# Patient Record
Sex: FEMALE | HISPANIC OR LATINO | URBAN - METROPOLITAN AREA
[De-identification: names, ages, dates, MRNs, and addresses within clinical notes are randomized per-mention and may not be internally consistent; named-entity substitution may affect disease eponyms.]

---

## 2017-07-09 ENCOUNTER — HOSPITAL ENCOUNTER (OUTPATIENT)
Facility: HOSPITAL | Age: 47
Discharge: LEFT BEFORE DISCHARGE | End: 2017-07-10
Attending: EMERGENCY MEDICINE
Payer: COMMERCIAL

## 2017-07-09 DIAGNOSIS — T81.9XXA POST-OPERATIVE COMPLICATION: ICD-10-CM

## 2017-07-09 LAB
ALBUMIN SERPL BCP-MCNC: 3.5 G/DL
ALP SERPL-CCNC: 72 U/L
ALT SERPL W/O P-5'-P-CCNC: 26 U/L
ANION GAP SERPL CALC-SCNC: 12 MMOL/L
AST SERPL-CCNC: 17 U/L
BASOPHILS # BLD AUTO: 0.01 K/UL
BASOPHILS NFR BLD: 0.2 %
BILIRUB SERPL-MCNC: 0.6 MG/DL
BUN SERPL-MCNC: 6 MG/DL
CALCIUM SERPL-MCNC: 9.6 MG/DL
CHLORIDE SERPL-SCNC: 105 MMOL/L
CO2 SERPL-SCNC: 24 MMOL/L
CREAT SERPL-MCNC: 0.7 MG/DL
DIFFERENTIAL METHOD: ABNORMAL
EOSINOPHIL # BLD AUTO: 0 K/UL
EOSINOPHIL NFR BLD: 0.3 %
ERYTHROCYTE [DISTWIDTH] IN BLOOD BY AUTOMATED COUNT: 23.6 %
EST. GFR  (AFRICAN AMERICAN): >60 ML/MIN/1.73 M^2
EST. GFR  (NON AFRICAN AMERICAN): >60 ML/MIN/1.73 M^2
GLUCOSE SERPL-MCNC: 98 MG/DL
HCT VFR BLD AUTO: 43 %
HGB BLD-MCNC: 14.4 G/DL
INR PPP: 1
LYMPHOCYTES # BLD AUTO: 1 K/UL
LYMPHOCYTES NFR BLD: 16.9 %
MAGNESIUM SERPL-MCNC: 1.9 MG/DL
MCH RBC QN AUTO: 26.1 PG
MCHC RBC AUTO-ENTMCNC: 33.5 %
MCV RBC AUTO: 78 FL
MONOCYTES # BLD AUTO: 0.5 K/UL
MONOCYTES NFR BLD: 8.9 %
NEUTROPHILS # BLD AUTO: 4.5 K/UL
NEUTROPHILS NFR BLD: 73.2 %
PLATELET # BLD AUTO: 183 K/UL
PMV BLD AUTO: 9.9 FL
POTASSIUM SERPL-SCNC: 3.3 MMOL/L
PROT SERPL-MCNC: 7 G/DL
PROTHROMBIN TIME: 11.1 SEC
RBC # BLD AUTO: 5.51 M/UL
SODIUM SERPL-SCNC: 141 MMOL/L
WBC # BLD AUTO: 6.09 K/UL

## 2017-07-09 PROCEDURE — 96361 HYDRATE IV INFUSION ADD-ON: CPT

## 2017-07-09 PROCEDURE — 83735 ASSAY OF MAGNESIUM: CPT

## 2017-07-09 PROCEDURE — 85610 PROTHROMBIN TIME: CPT

## 2017-07-09 PROCEDURE — 96374 THER/PROPH/DIAG INJ IV PUSH: CPT

## 2017-07-09 PROCEDURE — 63600175 PHARM REV CODE 636 W HCPCS: Performed by: EMERGENCY MEDICINE

## 2017-07-09 PROCEDURE — 96375 TX/PRO/DX INJ NEW DRUG ADDON: CPT

## 2017-07-09 PROCEDURE — 25000003 PHARM REV CODE 250: Performed by: EMERGENCY MEDICINE

## 2017-07-09 PROCEDURE — 85025 COMPLETE CBC W/AUTO DIFF WBC: CPT

## 2017-07-09 PROCEDURE — 99283 EMERGENCY DEPT VISIT LOW MDM: CPT | Mod: ,,, | Performed by: EMERGENCY MEDICINE

## 2017-07-09 PROCEDURE — 99285 EMERGENCY DEPT VISIT HI MDM: CPT | Mod: 25

## 2017-07-09 PROCEDURE — 80053 COMPREHEN METABOLIC PANEL: CPT

## 2017-07-09 RX ORDER — ONDANSETRON 2 MG/ML
4 INJECTION INTRAMUSCULAR; INTRAVENOUS
Status: COMPLETED | OUTPATIENT
Start: 2017-07-09 | End: 2017-07-09

## 2017-07-09 RX ADMIN — ONDANSETRON 4 MG: 2 INJECTION INTRAMUSCULAR; INTRAVENOUS at 09:07

## 2017-07-09 RX ADMIN — SODIUM CHLORIDE 1000 ML: 0.9 INJECTION, SOLUTION INTRAVENOUS at 09:07

## 2017-07-10 VITALS
DIASTOLIC BLOOD PRESSURE: 89 MMHG | HEIGHT: 65 IN | HEART RATE: 97 BPM | TEMPERATURE: 98 F | OXYGEN SATURATION: 99 % | WEIGHT: 170 LBS | SYSTOLIC BLOOD PRESSURE: 138 MMHG | RESPIRATION RATE: 18 BRPM | BODY MASS INDEX: 28.32 KG/M2

## 2017-07-10 PROBLEM — T81.9XXA POST-OPERATIVE COMPLICATION: Status: ACTIVE | Noted: 2017-07-10

## 2017-07-10 PROCEDURE — 63600175 PHARM REV CODE 636 W HCPCS: Performed by: EMERGENCY MEDICINE

## 2017-07-10 PROCEDURE — G0378 HOSPITAL OBSERVATION PER HR: HCPCS

## 2017-07-10 RX ORDER — IBUPROFEN 200 MG
16 TABLET ORAL
Status: DISCONTINUED | OUTPATIENT
Start: 2017-07-10 | End: 2017-07-10 | Stop reason: HOSPADM

## 2017-07-10 RX ORDER — IBUPROFEN 200 MG
24 TABLET ORAL
Status: DISCONTINUED | OUTPATIENT
Start: 2017-07-10 | End: 2017-07-10 | Stop reason: HOSPADM

## 2017-07-10 RX ORDER — MORPHINE SULFATE 2 MG/ML
2 INJECTION, SOLUTION INTRAMUSCULAR; INTRAVENOUS EVERY 4 HOURS PRN
Status: DISCONTINUED | OUTPATIENT
Start: 2017-07-10 | End: 2017-07-10 | Stop reason: HOSPADM

## 2017-07-10 RX ORDER — DEXAMETHASONE SODIUM PHOSPHATE 4 MG/ML
2 INJECTION, SOLUTION INTRA-ARTICULAR; INTRALESIONAL; INTRAMUSCULAR; INTRAVENOUS; SOFT TISSUE EVERY 12 HOURS
Status: DISCONTINUED | OUTPATIENT
Start: 2017-07-10 | End: 2017-07-10 | Stop reason: HOSPADM

## 2017-07-10 RX ORDER — ALPRAZOLAM 0.5 MG/1
1 TABLET ORAL
Status: DISCONTINUED | OUTPATIENT
Start: 2017-07-10 | End: 2017-07-10 | Stop reason: HOSPADM

## 2017-07-10 RX ORDER — INSULIN ASPART 100 [IU]/ML
0-5 INJECTION, SOLUTION INTRAVENOUS; SUBCUTANEOUS
Status: DISCONTINUED | OUTPATIENT
Start: 2017-07-10 | End: 2017-07-10 | Stop reason: HOSPADM

## 2017-07-10 RX ORDER — PANTOPRAZOLE SODIUM 40 MG/1
40 TABLET, DELAYED RELEASE ORAL DAILY
Status: DISCONTINUED | OUTPATIENT
Start: 2017-07-10 | End: 2017-07-10 | Stop reason: HOSPADM

## 2017-07-10 RX ORDER — PROCHLORPERAZINE EDISYLATE 5 MG/ML
5 INJECTION INTRAMUSCULAR; INTRAVENOUS
Status: COMPLETED | OUTPATIENT
Start: 2017-07-10 | End: 2017-07-10

## 2017-07-10 RX ORDER — GLUCAGON 1 MG
1 KIT INJECTION
Status: DISCONTINUED | OUTPATIENT
Start: 2017-07-10 | End: 2017-07-10 | Stop reason: HOSPADM

## 2017-07-10 RX ORDER — SODIUM CHLORIDE AND POTASSIUM CHLORIDE 150; 900 MG/100ML; MG/100ML
INJECTION, SOLUTION INTRAVENOUS CONTINUOUS
Status: DISCONTINUED | OUTPATIENT
Start: 2017-07-10 | End: 2017-07-10 | Stop reason: HOSPADM

## 2017-07-10 RX ADMIN — PROCHLORPERAZINE EDISYLATE 5 MG: 5 INJECTION INTRAMUSCULAR; INTRAVENOUS at 01:07

## 2017-07-10 NOTE — ED TRIAGE NOTES
Pt arrived to the ED with CC of an abscess to the base of the head. Pt states she has nausea and photosensitivity.

## 2017-07-10 NOTE — PROVIDER PROGRESS NOTES - EMERGENCY DEPT.
Encounter Date: 7/9/2017    ED Physician Progress Notes           Pt refused MRI  Wants to leave  Neurosurgery (admitting service) notified  AMA info placed on discharge instructions    Against Medical Advice    The patient chooses to sign out against medical advice and has the capacity to make that decision and the right to do so.  The patient was counseled on the risks of leaving including but not limited to death, disability, and understands these risks and wishes to leave anyway.  An attempt was made to remove barriers to accepting the recommended care.  The patient understands that they can return to the ED if they change their mind and will be treated to the best of my ability within the constraints of what they will allow me to do.      Antoine Villalobos MD, NED, CPE, FACEP  Department of Emergency Medicine  , [employer]

## 2017-07-10 NOTE — ED PROVIDER NOTES
Encounter Date: 7/9/2017    SCRIBE #1 NOTE: I, Erika Daniel, am scribing for, and in the presence of,  Dr. Redding. I have scribed the following portions of the note - the Resident attestation. Other sections scribed: Attending Notes .       History     Chief Complaint   Patient presents with    Abscess     Pt reports having surgery 3 weeks ago and now has abscess to neck below incision.     HPI   Patient is 48 yo female with history of metastatic breast cancer, diagnosed 10 years ago. Breast cancer spread to the lung. Patient had 12 radiations and multiple chemotherapy. Patient had craniotomy in June 2017 after she developed neurological symptoms and lesions found on imaging above the base of the neck/occipital region and was removed. Lesion turned out to be a necrotic tissue 2/2 to radiation. Patient was doing well post craniotomy. Patient now presented to the ER after noticing swelling a day prior to presentation and vomiting and nausea this morning. She is visiting  Her sister in [place] however her treatment has been in [place], [place]. Patient does not have other symptoms. Denies fever, chills, headaches, LOC, neck stiffness.   Review of patient's allergies indicates:   Allergen Reactions    Iodine and iodide containing products Hives     Past Medical History:   Diagnosis Date    Breast cancer     left breast    Metastasis to lung      No past surgical history on file.  No family history on file.  Social History   Substance Use Topics    Smoking status: Not on file    Smokeless tobacco: Not on file    Alcohol use Not on file     Review of Systems   Constitutional: Positive for diaphoresis, fatigue and fever. Negative for chills.   HENT: Positive for facial swelling. Negative for trouble swallowing and voice change.    Eyes: Negative for photophobia and visual disturbance.   Respiratory: Negative for cough, chest tightness and shortness of breath.    Cardiovascular: Negative for chest pain,  palpitations and leg swelling.   Gastrointestinal: Negative for abdominal distention, abdominal pain, diarrhea, nausea and vomiting.   Genitourinary: Negative for difficulty urinating, flank pain, frequency and urgency.   Musculoskeletal: Negative for back pain, gait problem and neck pain.   Skin: Negative for pallor and rash.   Neurological: Negative for dizziness, tremors, weakness, light-headedness and headaches.       Physical Exam     Initial Vitals [07/09/17 1900]   BP Pulse Resp Temp SpO2   (!) 122/92 106 18 97.8 °F (36.6 °C) 100 %      MAP       102         Physical Exam    Constitutional: She appears well-developed and well-nourished. She is diaphoretic. She appears distressed.   HENT:   Right Ear: External ear normal.   Left Ear: External ear normal.   4.5 inch by 1.5 inches fluid collection over mid-incision in the occipital region, fluctuant and minimally tender with no discharge or erythema . Smaller 1x1 in fluctuance at inferior end of incision, also no erythema     Eyes: EOM are normal. Pupils are equal, round, and reactive to light.   Neck: Normal range of motion. Neck supple.   Cardiovascular: Normal rate and regular rhythm. Exam reveals no friction rub.    No murmur heard.  Pulmonary/Chest: Breath sounds normal. No respiratory distress. She has no rales. She exhibits no tenderness.   Abdominal: Soft. Bowel sounds are normal. She exhibits no distension. There is no tenderness.   Musculoskeletal: Normal range of motion. She exhibits no edema or tenderness.   Neurological: She is alert and oriented to person, place, and time. She has normal strength. No cranial nerve deficit.   Skin: Skin is warm. No erythema.         ED Course   Procedures  Labs Reviewed   CBC W/ AUTO DIFFERENTIAL - Abnormal; Notable for the following:        Result Value    RBC 5.51 (*)     MCV 78 (*)     MCH 26.1 (*)     RDW 23.6 (*)     Gran% 73.2 (*)     Lymph% 16.9 (*)     All other components within normal limits  "  COMPREHENSIVE METABOLIC PANEL - Abnormal; Notable for the following:     Potassium 3.3 (*)     All other components within normal limits   PROTIME-INR   MAGNESIUM   CBC W/ AUTO DIFFERENTIAL   BASIC METABOLIC PANEL   SEDIMENTATION RATE, MANUAL   HIGH SENSITIVITY CRP   PROCALCITONIN             Medical Decision Making:   Clinical Tests:   Radiological Study: Ordered  Other:   I have discussed this case with another health care provider.       APC / Resident Notes:   Patient s/p craniotomy. Incision site swelling with fluctuant mass. DDx include seroma vs abscess 2/2 recent surgical procedure/craniotomy. Will do CT head (non contrast due to iodine allergy)  Will give Zofran for nausea/vomiting, check labs for any REGGIE. Will bolus with 1000 ml NS.      Update:  Lab  Results unremarkable. CT head results came back "findings may reflect a postoperative seroma, hematoma, abscess, or pseudomeningocele."  Will consult Neurosurgery for recs    Resident signed out. Patient under care of Dr. Redding  10:35 PM       Scribe Attestation:   Scribe #1: I performed the above scribed service and the documentation accurately describes the services I performed. I attest to the accuracy of the note.    Attending Attestation:   Physician Attestation Statement for Resident:  As the supervising MD   Physician Attestation Statement: I have personally seen and examined this patient.   I agree with the above history. -:   As the supervising MD I agree with the above PE.    As the supervising MD I agree with the above treatment, course, plan, and disposition.          Physician Attestation for Scribe:  Physician Attestation Statement for Scribe #1: I, Dr. Redding, reviewed documentation, as scribed by Erika Daniel in my presence, and it is both accurate and complete.         Attending ED Notes:     Patient with history of metastatic breast cancer initially presenting with vomiting since yesterday. She also noted new fluctuant mass " over posterior skull where recent craniotomy was performed to evaluate for potentially malignant lesion. Surgery performed in [place] 4 weeks ago with no known complications since. Biopsies showed necrotic lesion that was not malignant. Not on active chemo. Labs unremarkable except for mild hypokalemia. Given post-op fluid collection concerning for seroma vs. Abscess. CT done and shows fluid collection concerning for pseudomeningocele vs. Post-op seroma vs. Abscess. Abscess less-likely given no fever, white count, or erythema. Neurosurgery consulted, and given the extent of fluid collection, will admit for MRI and further evaluation.           ED Course     Clinical Impression:   Postoperative seroma vs hematoma vs abscess                           Daniel Redding MD  07/10/17 6039

## 2017-07-10 NOTE — ED NOTES
"Informed DR Martinez that the pt is requesting to leave AMA and asked that he come see the pt. DR Mills "  Im not coming in at 3am to see the pt"   "

## 2017-07-10 NOTE — CONSULTS
[facility]  Neurosurgery  History & Physical    Patient Name: Maritza Velasco  MRN: [id number]  Admission Date: 7/9/2017  Attending Physician: Dr. Chacon  Primary Care Provider: No primary care provider on file.       Subjective:     Chief Complaint/Reason for Admission: fluid collection    History of Present Illness: 48 y/o F history of metastatic breast cancer, diagnosed 10 years ago. Breast cancer spread to the lung, s/p 12 rounds radiations & multiple chemotherapy. Pt underwent suboccipital crani 6/21/2017 at home in [place] after she developed neurological symptoms and lesions found on imaging above the base of the neck/occipital region and was removed. 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 Patient was doing well post op, visiting family here. Developed fluid collection over last couple days at operative site. No leaking. +photophobia/N/V/dizziness. Denies F/C.       (Not in a hospital admission)    Review of patient's allergies indicates:   Allergen Reactions    Iodine and iodide containing products Hives       Past Medical History:   Diagnosis Date    Breast cancer     left breast    Metastasis to lung      No past surgical history on file.  Family History     None        Social History Main Topics    Smoking status: Not on file    Smokeless tobacco: Not on file    Alcohol use Not on file    Drug use: Unknown    Sexual activity: Not on file     Review of Systems   Constitutional: Negative.    Eyes: Negative.    Respiratory: Negative.    Cardiovascular: Negative.    Gastrointestinal: Negative.    Endocrine: Negative.    Genitourinary: Negative.    Musculoskeletal: Negative.    Skin: Positive for wound (postop incision healing. no leaking from fluid collection).   Allergic/Immunologic: Negative.    Neurological: Positive for dizziness, light-headedness and headaches. Negative for tremors, seizures, syncope, facial asymmetry, speech difficulty, weakness and numbness.    Hematological: Negative.    Psychiatric/Behavioral: Negative.      Objective:     Weight: 77.1 kg (170 lb)  Body mass index is 28.29 kg/m².  Vital Signs (Most Recent):  Temp: 97.8 °F (36.6 °C) (17)  Pulse: 106 (17)  Resp: 18 (17)  BP: (!) 122/92 (17)  SpO2: 100 % (17) Vital Signs (24h Range):  Temp:  [97.8 °F (36.6 °C)] 97.8 °F (36.6 °C)  Pulse:  [106] 106  Resp:  [18] 18  SpO2:  [100 %] 100 %  BP: (122)/(92) 122/92            Temp (24hrs), Av.8 °F (36.6 °C), Min:97.8 °F (36.6 °C), Max:97.8 °F (36.6 °C)                 Physical Exam:    Constitutional: She appears well-developed and well-nourished. She is not diaphoretic. No distress.     Eyes: Pupils are equal, round, and reactive to light. EOM are normal. Right eye exhibits no discharge. Left eye exhibits no discharge.     Cardiovascular: Normal rate.     Abdominal: Soft.     Skin: Skin displays no rash on trunk and no rash on extremities.     Psych/Behavior: She is alert. She is oriented to person, place, and time. She has a normal mood and affect.     Musculoskeletal:        Neck: Range of motion is full. There is tenderness. Muscle strength is 5/5. Tone is normal.        Back: Muscle strength is 5/5. Tone is normal.        Right Upper Extremities: Range of motion is full. There is no tenderness. Muscle strength is 5/5. Tone is normal.        Left Upper Extremities: Range of motion is full. There is no tenderness. Muscle strength is 5/5. Tone is normal.       Right Lower Extremities: Range of motion is full. There is no tenderness. Muscle strength is 5/5.        Left Lower Extremities: Range of motion is full. There is no tenderness. Muscle strength is 5/5.     Neurological:        Coordination: She has abnormal finger to nose coordination (RUE dysmetria. LUE normal. ).        DTRs: DTRs are DTRS NORMAL AND SYMMETRICnormal and symmetric. DTRs are normal.        Cranial nerves: Cranial nerve(s) II, III,  IV, V, VI, VII, VIII, IX, X, XI and XII are intact.       Significant Labs:    Recent Labs  Lab 07/09/17 2115   GLU 98      K 3.3*      CO2 24   BUN 6   CREATININE 0.7   CALCIUM 9.6   MG 1.9       Recent Labs  Lab 07/09/17 2115   WBC 6.09   HGB 14.4   HCT 43.0          Recent Labs  Lab 07/09/17 2115   INR 1.0     Microbiology Results (last 7 days)     ** No results found for the last 168 hours. **        ABGs: No results for input(s): PH, PCO2, PO2, HCO3, POCSATURATED, BE in the last 48 hours.  Cardiac markers: No results for input(s): CKMB, CPKMB, TROPONINT, TROPONINI, MYOGLOBIN in the last 48 hours.  CMP:   Recent Labs  Lab 07/09/17 2115   GLU 98   CALCIUM 9.6   ALBUMIN 3.5   PROT 7.0      K 3.3*   CO2 24      BUN 6   CREATININE 0.7   ALKPHOS 72   ALT 26   AST 17   BILITOT 0.6     CRP: No results for input(s): CRP in the last 48 hours.  ESR: No results for input(s): POCESR, ERYTHROCYTES in the last 48 hours.  LFTs:   Recent Labs  Lab 07/09/17 2115   ALT 26   AST 17   ALKPHOS 72   BILITOT 0.6   PROT 7.0   ALBUMIN 3.5     Procalcitonin: No results for input(s): PROCAL in the last 48 hours.  Recent Lab Results       07/09/17 2115      Albumin 3.5     Alkaline Phosphatase 72     ALT 26     Anion Gap 12     AST 17     Baso # 0.01     Basophil% 0.2     Total Bilirubin 0.6  Comment:  For infants and newborns, interpretation of results should be based  on gestational age, weight and in agreement with clinical  observations.  Premature Infant recommended reference ranges:  Up to 24 hours.............<8.0 mg/dL  Up to 48 hours............<12.0 mg/dL  3-5 days..................<15.0 mg/dL  6-29 days.................<15.0 mg/dL       BUN, Bld 6     Calcium 9.6     Chloride 105     CO2 24     Creatinine 0.7     Differential Method Automated     eGFR if African American >60.0     eGFR if non  >60.0  Comment:  Calculation used to obtain the estimated glomerular  filtration  rate (eGFR) is the CKD-EPI equation. Since race is unknown   in our information system, the eGFR values for   -American and Non--American patients are given   for each creatinine result.       Eos # 0.0     Eosinophil% 0.3     Glucose 98     Gran # 4.5     Gran% 73.2(H)     Hematocrit 43.0     Hemoglobin 14.4     Coumadin Monitoring INR 1.0  Comment:  Coumadin Therapy:  2.0 - 3.0 for INR for all indicators except mechanical heart valves  and antiphospholipid syndromes which should use 2.5 - 3.5.       Lymph # 1.0     Lymph% 16.9(L)     Magnesium 1.9     MCH 26.1(L)     MCHC 33.5     MCV 78(L)     Mono # 0.5     Mono% 8.9     MPV 9.9     Platelets 183     Potassium 3.3(L)     Total Protein 7.0     Protime 11.1     RBC 5.51(H)     RDW 23.6(H)     Sodium 141     WBC 6.09         All pertinent labs from the last 24 hours have been reviewed.    Significant Diagnostics:  CT: Ct Head Without Contrast    Result Date: 7/9/2017   In this patient who is status post occipital craniectomy for resection of posterior fossa mass, there is a subcutaneous fluid collection posterior to the craniectomy site, extending inferiorly, distal to the view of this image.  These findings may represent a pseudomeningocele with differential considerations including postoperative seroma, hematoma or abscess. Suggest neurosurgical consultation. No evidence of acute infarction or hemorrhage. This report has been flagged in the [facility] medical record. ______________________________________ Electronically signed by resident: MONO HILLS MD Date:     07/09/17 Time:    22:23 As the supervising and teaching physician, I personally reviewed the images and resident's interpretation and I agree with the findings. Electronically signed by: CARLA JOHNS MD Date:     07/09/17 Time:    22:55     Assessment/Plan:   48 y/o F h/o metastatic breast cancer s/p recent suboccipital crani w/ pseudomeningocele.     Neuro stable  No acute  surgical intervention.   MRI brain w/ wout with [facility] protocol.   Steroids.   Inflammatory markers; f/u labs.   Refrain from tapping at this point.   Will Obs for 24 hours pending MRI and lab workup.   Discussed w/ Dr. Chacon.     There are no hospital problems to display for this patient.      Bismark Martinez MD  Neurosurgery  [facility]

## 2017-07-10 NOTE — PROGRESS NOTES
Paged 3 times within 10 minutes by ED requesting MD to sign AMA papers. MD explained that already spoke to family about leaving AMA over the phone once notified of patient's wishes. Family wished to leave despite our reccomendations. MD explained to RN that would not be coming back in to hospital at 3:22am to sign AMA papers and could be done later this morning.

## 2017-07-10 NOTE — ED NOTES
Patient wheeled back from MRI and does not want to complete it. Dr. Summers & Neurosurgery notified. Patient wants to go AMA.

## 2017-07-10 NOTE — ED NOTES
Spoke with Neurosurgery via telephone about patient going AMA/discharge. MD spoke with family member (sister) via telephone about patient going AMA.

## 2017-07-10 NOTE — DISCHARGE INSTRUCTIONS
Against Medical Advice    The patient chooses to sign out against medical advice and has the capacity to make that decision and the right to do so.  The patient was counseled on the risks of leaving including but not limited to death, disability, and understands these risks and wishes to leave anyway.  An attempt was made to remove barriers to accepting the recommended care.  The patient understands that they can return to the ED if they change their mind.

## 2017-07-10 NOTE — ED NOTES
Patient identifiers verified and correct for Maritza Velasco  C/C: abscess to base of neck.  APPEARANCE: awake and alert in NAD. Appears anxious.   SKIN: warm, dry and intact. Approximate 2inch abscess to base of neck.  MUSCULOSKELETAL: Patient moving all extremities spontaneously, no obvious swelling or deformities noted. Ambulates independently.  RESPIRATORY: no shortness of breath. All breath sounds CTA bilaterally.  CARDIAC: heart tones normal. Regular rate and rhythm; 2+ distal pulses; no peripheral edema  ABDOMEN: S/ND/NT, normoactive bowel sounds present in all four quadrants. Normal stool pattern.  : voids spontaneously without difficulty.  Neurologic: AAO x 4; reports photosensitivity.